# Patient Record
Sex: MALE | Race: WHITE
[De-identification: names, ages, dates, MRNs, and addresses within clinical notes are randomized per-mention and may not be internally consistent; named-entity substitution may affect disease eponyms.]

---

## 2019-01-02 ENCOUNTER — HOSPITAL ENCOUNTER (INPATIENT)
Dept: HOSPITAL 12 - SRC | Age: 24
LOS: 6 days | Discharge: HOME | DRG: 895 | End: 2019-01-08
Attending: INTERNAL MEDICINE | Admitting: INTERNAL MEDICINE
Payer: COMMERCIAL

## 2019-01-02 VITALS — BODY MASS INDEX: 27.2 KG/M2 | HEIGHT: 70 IN | WEIGHT: 190 LBS

## 2019-01-02 VITALS — SYSTOLIC BLOOD PRESSURE: 103 MMHG | DIASTOLIC BLOOD PRESSURE: 58 MMHG

## 2019-01-02 DIAGNOSIS — F11.23: Primary | ICD-10-CM

## 2019-01-02 DIAGNOSIS — Z86.718: ICD-10-CM

## 2019-01-02 DIAGNOSIS — Z79.899: ICD-10-CM

## 2019-01-02 DIAGNOSIS — G47.00: ICD-10-CM

## 2019-01-02 DIAGNOSIS — Z79.01: ICD-10-CM

## 2019-01-02 DIAGNOSIS — L74.519: ICD-10-CM

## 2019-01-02 DIAGNOSIS — F41.1: ICD-10-CM

## 2019-01-02 DIAGNOSIS — F32.9: ICD-10-CM

## 2019-01-02 LAB
ALP SERPL-CCNC: 104 U/L (ref 50–136)
ALT SERPL W/O P-5'-P-CCNC: 26 U/L (ref 16–63)
AMPHETAMINES UR QL SCN>1000 NG/ML: NEGATIVE
AST SERPL-CCNC: 19 U/L (ref 15–37)
BARBITURATES UR QL SCN: NEGATIVE
BASOPHILS # BLD AUTO: 0 K/UL (ref 0–8)
BASOPHILS NFR BLD AUTO: 0.7 % (ref 0–2)
BILIRUB SERPL-MCNC: 0.3 MG/DL (ref 0.2–1)
BUN SERPL-MCNC: 8 MG/DL (ref 7–18)
CHLORIDE SERPL-SCNC: 102 MMOL/L (ref 98–107)
CO2 SERPL-SCNC: 28 MMOL/L (ref 21–32)
COCAINE UR QL SCN: NEGATIVE
CREAT SERPL-MCNC: 1 MG/DL (ref 0.6–1.3)
EOSINOPHIL # BLD AUTO: 0.2 K/UL (ref 0–0.7)
EOSINOPHIL NFR BLD AUTO: 2.4 % (ref 0–7)
ETHANOL SERPL-MCNC: < 3 MG/DL (ref 0–0)
GLUCOSE SERPL-MCNC: 109 MG/DL (ref 74–106)
HCT VFR BLD AUTO: 44.6 % (ref 36.7–47.1)
HGB BLD-MCNC: 15.5 G/DL (ref 12.5–16.3)
LYMPHOCYTES # BLD AUTO: 2 K/UL (ref 20–40)
LYMPHOCYTES NFR BLD AUTO: 29.1 % (ref 20.5–51.5)
MAGNESIUM SERPL-MCNC: 2.3 MG/DL (ref 1.8–2.4)
MCH RBC QN AUTO: 30.1 UUG (ref 23.8–33.4)
MCHC RBC AUTO-ENTMCNC: 35 G/DL (ref 32.5–36.3)
MCV RBC AUTO: 86.7 FL (ref 73–96.2)
MONOCYTES # BLD AUTO: 0.4 K/UL (ref 2–10)
MONOCYTES NFR BLD AUTO: 6.1 % (ref 0–11)
NEUTROPHILS # BLD AUTO: 4.2 K/UL (ref 1.8–8.9)
NEUTROPHILS NFR BLD AUTO: 61.7 % (ref 38.5–71.5)
OPIATES UR QL SCN: POSITIVE
PCP UR QL SCN>25 NG/ML: NEGATIVE
PLATELET # BLD AUTO: 221 K/UL (ref 152–348)
POTASSIUM SERPL-SCNC: 3.6 MMOL/L (ref 3.5–5.1)
RBC # BLD AUTO: 5.14 MIL/UL (ref 4.06–5.63)
THC UR QL SCN>50 NG/ML: NEGATIVE
TSH SERPL DL<=0.005 MIU/L-ACNC: 0.49 MIU/ML (ref 0.36–3.74)
WBC # BLD AUTO: 6.9 K/UL (ref 3.6–10.2)
WS STN SPEC: 8.5 G/DL (ref 6.4–8.2)

## 2019-01-02 PROCEDURE — G0480 DRUG TEST DEF 1-7 CLASSES: HCPCS

## 2019-01-02 PROCEDURE — A4663 DIALYSIS BLOOD PRESSURE CUFF: HCPCS

## 2019-01-02 PROCEDURE — HZ2ZZZZ DETOXIFICATION SERVICES FOR SUBSTANCE ABUSE TREATMENT: ICD-10-PCS | Performed by: INTERNAL MEDICINE

## 2019-01-02 RX ADMIN — BUPRENORPHINE HYDROCHLORIDE PRN MG: 2 TABLET SUBLINGUAL at 20:54

## 2019-01-02 NOTE — NUR
ADMISSION NOTE

Pt is a 23 yr old male, AA&Ox4. Pt is presenting himself to [facility] for heroin use 
and Suboxone use. Pt is observed with anxiety m/b being fidgety. Pt is c/o headache and 
joint pain. Body check was complete; skin is intact, warm and moist to touch. Lung sounds 
are clear bilaterally. Pt states of PMH of Anxiety, DVT, Sepsis, Insomnia and Hyperhidrosis. 
Pt denies any Hx of seizures, 5150 or overdose. Pt states of taking home medication 
Gabapentin, Clonidine PRN for anxiety, Xarelto for Hx of DVT and Seroquel for sleep. All 
home medication was reconciled. Pt states PCP is Dr. Huffman and Psychiatrist is Dr. SHARPE who 
prescribe Suboxone maintenance 3 weeks ago. Pt denies any allergies and follows a regular 
diet. 

Substance Use Hx:

1. Heroin- Pt states he has been first using heroin at the age of 16 yrs old IV or smoked. 
Pt states he relapsed 1 week and 2 days ago and was smoking 1g of heroin daily. Last use was 
on 1/1/19 at 0400, pt states he smoked 0.5g.

2. Suboxone  Pt states he was prescribed Suboxone maintenance 3 weeks ago from his 
Psychiatrist. Pt verbalized he was first taking 8mg SL daily for 1 week and increased to 
12mg SL. Pt states he was on it Suboxone maintenance for 2 weeks before relapsing. Last use 
was today on 1/2/19 at 0700, pt states of taking 8mg SL. 

Pt verbalized he relapsed because his father was diagnosed with 6 months to live if he does 
not receive a heart transplant. Pt states, They told him it would be very difficult to find 
a heart for my dad because of his blood type and the pressure of having to find out he had 6 
months to live just really hurt me so I relapsed. The day I relapsed, they found a match for 
him. Pt states he wants to get sober in order to care for his father. Pt verbalized he 
wants to go into rehabilitation after care and maintain his sobriety. Pt does have his 
family supporting him. Pt was currently in [facility], prior to that he was at 
[facility] for rehabilitation. Pt was previously admitted to [facility] on November 6, 2018. 

Report to Dr. Marshall, pt will be on Subutex PRN for s/s of w/d. Pt was educated one 
medication regimen. Pt was able to verbalize understanding. COWS score upon admission was 6. 
Safety precautions observed. Endorsed to night shift nurse to continue with care.

## 2019-01-02 NOTE — NUR
pre-assessment note: pt is in stable condition no s/s of pain or discomfort. pt is alert and 
oriented and the main source of information. explained protocols and procedures and pt 
verbalized understanding.pt will be admitted to the 3rd floor.

## 2019-01-02 NOTE — NUR
PRN MEDICATION REASSESSMENT



Patient reported having relief from withdrawal symptoms and he stated My stomach feels a 
lot better. I just still have a bit of a headache and my legs are still a little shaky, but 
it was very helpful. Thank you. Patient states that his headache is now at a 4 out of 10 
and his legs are still slightly restless, but manageable. PRN meds Subutex and Imodium noted 
to be effective. Will continue to monitor.

## 2019-01-02 NOTE — NUR
START OF SHIFT



Received patient awake, alert, and oriented x4 lying in bed watching television. Patient is 
a 23 year old male admitted for medically supervised detox from heroin and Suboxone with 
secondary diagnoses of anxiety, insomnia, and hyperhidrosis with a history of sepsis and 
DVT. Per endorsement patient was admitted today at 1644. He relapsed 16 days ago the night 
that he discovered that his father was in heart failure and would need a transplant which he 
was told would be very unlikely to find a proper donor. The stress of this news triggered 
his relapse. Upon assessment, the patient reported having restlessness, anxiety, stomach 
cramps, diaphoresis, and a headache. PRN meds will be given. Initial COWS score is 6 upon 
admission. HOB and bilateral side rails raised for safety. Call light functional and within 
reach. Bed is in a low position and wheels are locked. Will continue to monitor.

## 2019-01-02 NOTE — NUR
COWS = 11



Patient noted with facial flushing, restlessness, diffuse discomfort, stomach cramping, 1 
episode of loose stool, tremors, and anxiety. PRN meds to be administered for symptomatic 
relief. COWS score is 11. Will continue to monitor.

-------------------------------------------------------------------------------

Addendum: 01/03/19 at 0633 by BAYLEE NUR RN

-------------------------------------------------------------------------------

AMENDMENT

COWS score is 12

## 2019-01-02 NOTE — NUR
PRN SUBUTEX AND IMODIUM ADMINISTRATION



Patient reported having symptoms of restlessness (in legs), stomach cramps, diarrhea, 
diaphoresis, and a headache at a level of 7 out of 10. PRN meds Subutex 4 mg and Imodium 4 
mg given per MD orders and patient request. Will continue to monitor closely and assess for 
effectiveness.

## 2019-01-03 VITALS — SYSTOLIC BLOOD PRESSURE: 114 MMHG | DIASTOLIC BLOOD PRESSURE: 67 MMHG

## 2019-01-03 VITALS — DIASTOLIC BLOOD PRESSURE: 49 MMHG | SYSTOLIC BLOOD PRESSURE: 103 MMHG

## 2019-01-03 VITALS — DIASTOLIC BLOOD PRESSURE: 61 MMHG | SYSTOLIC BLOOD PRESSURE: 114 MMHG

## 2019-01-03 VITALS — SYSTOLIC BLOOD PRESSURE: 126 MMHG | DIASTOLIC BLOOD PRESSURE: 76 MMHG

## 2019-01-03 VITALS — DIASTOLIC BLOOD PRESSURE: 74 MMHG | SYSTOLIC BLOOD PRESSURE: 131 MMHG

## 2019-01-03 RX ADMIN — GABAPENTIN SCH MG: 400 CAPSULE ORAL at 15:35

## 2019-01-03 RX ADMIN — BUPRENORPHINE HYDROCHLORIDE SCH MG: 2 TABLET SUBLINGUAL at 20:34

## 2019-01-03 RX ADMIN — QUETIAPINE SCH MG: 100 TABLET, FILM COATED ORAL at 20:35

## 2019-01-03 RX ADMIN — RIVAROXABAN SCH MG: 10 TABLET, FILM COATED ORAL at 17:40

## 2019-01-03 RX ADMIN — BUPRENORPHINE HYDROCHLORIDE SCH MG: 2 TABLET SUBLINGUAL at 17:41

## 2019-01-03 RX ADMIN — THERA TABS SCH UDTAB: TAB at 08:34

## 2019-01-03 RX ADMIN — BUPRENORPHINE HYDROCHLORIDE SCH MG: 2 TABLET SUBLINGUAL at 13:48

## 2019-01-03 RX ADMIN — GABAPENTIN SCH MG: 400 CAPSULE ORAL at 20:35

## 2019-01-03 RX ADMIN — METHOCARBAMOL TABLETS PRN MG: 750 TABLET, COATED ORAL at 08:34

## 2019-01-03 RX ADMIN — BUPRENORPHINE HYDROCHLORIDE PRN MG: 2 TABLET SUBLINGUAL at 08:34

## 2019-01-03 NOTE — NUR
COWS

Pt has generalized body aches 7/10, anxiety, restlessness, runny nose, tearing eyes, goose 
bumps, one episode of diarrhea. His mood is depressed and affect is flat. He is unshaven, 
there are food wrappers on the floor and empty bottles around the room. COWS score is 16. He 
refuses Imodium at this time.

## 2019-01-03 NOTE — NUR
PRN Subutex and Robaxin

Pt has generalized body aches 7/10, anxiety, restlessness, runny nose, tearing eyes, goose 
bumps, one episode of diarrhea. His mood is depressed and affect is flat. He is unshaven, 
there are food wrappers on the floor and empty bottles around the room. COWS score is 16. 
PRN Subutex and Robaxin administered.

## 2019-01-03 NOTE — NUR
COWS = 8



Patient continues to present with anxiety, irritability, stomach cramps, diffuse discomfort, 
and facial flushing. No PRNs requested at this time. Will continue to monitor.

## 2019-01-03 NOTE — NUR
END OF SHIFT



Patient is noted lying in bed with eyes closed and even, unlabored respirations. Patient is 
a 23 year old male admitted for medically supervised detox from heroin and Suboxone with 
secondary diagnoses of anxiety, insomnia, and hyperhidrosis with a history of sepsis and 
DVT. During the shift, the patient reported feeling anxious, tremulous, restless legs, 
diaphoresis, stomach cramps, and a headache and also experienced 1 episode of diarrhea. PRN 
meds Imodium and Subutex given for COWS score of 12 and diarrhea. PRN meds noted to be 
effective. Last COWS score is 8. Patient slept for about 7 hours in the night.  HOB flat and 
bilateral side rails raised for safety. Call light is functional and within reach. Bed is in 
a low position with wheels locked. Endorsed to oncoming AM nurse.

## 2019-01-03 NOTE — NUR
COWS ASSESSMENT



COWS 14. . Pt has been presenting w/ diaphoretic sweats, flushing, body aches, headaches, 
tremors, flat affect, anxiety, and restlessness. V/S: T:97.8, P:77, RR:18, SPO2:100, 
BP:126/80.

## 2019-01-03 NOTE — NUR
VITALS AND COWS DEFERRED



Patient noted lying in bed with eyes closed and even, unlabored respirations. HOB and 
bilateral side rails raised for safety. Vital signs refused and COWS assessment deferred. 
Call light is functional and within reach. Will continue to monitor.

## 2019-01-03 NOTE — NUR
START OF SHIFT NOTE



Rcvd report from outgoing nurse. Pt is a 24 y/o male A/O to person, place, time, and 
purpose. Pt was admitted for medically supervised withdrawal from Opiates. Pt started a 5 
day Subutex taper today. Pt has been presenting w/ diaphoretic sweats, flushing, body aches, 
headaches, tremors, flat affect, anxiety, and restlessness. Pt rcvd PRN Robaxin and Subutex, 
both were noted effective by outgoing nurse. Last COWS 13 @ 1600. Call light is within 
reach. Pt will continue to be monitored and needs met.

## 2019-01-03 NOTE — NUR
START OF SHIFT 

Received report from night shift nurse. Pt is lying in bed resting watching TV. He is a 22 yo Male admitted to [facility] on 1/2 for Opiate withdrawal. He is A&O and ambulatory. PRN 
Subutex ordered for the management of withdrawal symptoms. He was administered PRN Subutex 
and Imodium on night shift. He denies recent episodes of diarrhea. Last COWS was 8 and he 
slept for 7 hours. Safety measures in place.

## 2019-01-04 VITALS — SYSTOLIC BLOOD PRESSURE: 124 MMHG | DIASTOLIC BLOOD PRESSURE: 76 MMHG

## 2019-01-04 VITALS — SYSTOLIC BLOOD PRESSURE: 118 MMHG | DIASTOLIC BLOOD PRESSURE: 72 MMHG

## 2019-01-04 VITALS — DIASTOLIC BLOOD PRESSURE: 63 MMHG | SYSTOLIC BLOOD PRESSURE: 111 MMHG

## 2019-01-04 VITALS — SYSTOLIC BLOOD PRESSURE: 134 MMHG | DIASTOLIC BLOOD PRESSURE: 96 MMHG

## 2019-01-04 PROCEDURE — HZ31ZZZ INDIVIDUAL COUNSELING FOR SUBSTANCE ABUSE TREATMENT, BEHAVIORAL: ICD-10-PCS

## 2019-01-04 PROCEDURE — HZ41ZZZ GROUP COUNSELING FOR SUBSTANCE ABUSE TREATMENT, BEHAVIORAL: ICD-10-PCS

## 2019-01-04 RX ADMIN — BUPRENORPHINE HYDROCHLORIDE SCH MG: 2 TABLET SUBLINGUAL at 15:01

## 2019-01-04 RX ADMIN — THERA TABS SCH UDTAB: TAB at 09:00

## 2019-01-04 RX ADMIN — GABAPENTIN SCH MG: 400 CAPSULE ORAL at 09:00

## 2019-01-04 RX ADMIN — RIVAROXABAN SCH MG: 10 TABLET, FILM COATED ORAL at 17:20

## 2019-01-04 RX ADMIN — BUPRENORPHINE HYDROCHLORIDE SCH MG: 2 TABLET SUBLINGUAL at 20:47

## 2019-01-04 RX ADMIN — BUPRENORPHINE HYDROCHLORIDE SCH MG: 2 TABLET SUBLINGUAL at 09:00

## 2019-01-04 RX ADMIN — GABAPENTIN SCH MG: 400 CAPSULE ORAL at 12:58

## 2019-01-04 RX ADMIN — QUETIAPINE SCH MG: 100 TABLET, FILM COATED ORAL at 20:47

## 2019-01-04 RX ADMIN — GABAPENTIN SCH MG: 400 CAPSULE ORAL at 17:18

## 2019-01-04 NOTE — NUR
COWS DEFERRED



Pt is in bed w/ his eyes closed. Pt's respirations are unlabored and even. Pt will continue 
to be monitored.

## 2019-01-04 NOTE — NUR
COWS ASSESSMENT



COWS 10. Pt has been presenting w/ diaphoretic sweats, runny/stuffy nose, anxiety, depressed 
mood, flat affect, and blocking thought process. V/S: T:98.5, P:85, RR:14, SPO2:97, 
BP:124/76.

## 2019-01-04 NOTE — NUR
END OF SHIFT NOTE



Endorsed pt to oncoming nurse. Pt is a 24 y/o male A/O to person, place, time, and purpose. 
Pt was admitted for medically supervised withdrawal from Opiates. Pt completed day 1 of a 5 
day Subutex taper today. Pt continued presenting w/ diaphoretic sweats, flushing, body 
aches, headaches, tremors, flat affect, anxiety, and restlessness. Pt denies any S/I or H/I. 
No PRN medications were given during current shift. Pts fluid intake was 710ml and he slept 
for 7hrs. Last COWS 14 @ 2000. Call light is within reach.

## 2019-01-04 NOTE — NUR
START OF SHIFT NOTE



Rcvd report from outgoing nurse. Pt is a 22 y/o male A/O to person, place, time, and 
purpose. Pt was admitted for medically supervised withdrawal from Opiates. Pt is on day 2 of 
a 5day Subutex taper. Pt has been presenting w/ diaphoretic sweats, runny/stuffy nose, 
anxiety, depressed mood, flat affect, and blocking thought process. Pt rcvd no PRN 
medications during previous shift. Last COWS 9 @ 1600. Call light is within reach. Pt will 
continue to be monitored and needs met.

## 2019-01-04 NOTE — NUR
END OF SHIFT

Patient continues with ongoing 5 day Subutex taper as ordered, currently on day 2 of taper. 
Patients vital signs WNL. Received no PRNs during shift. Was noted exbiting the following 
s/sx of withdrawal: sweats, mild bone and joint aches, nasal congestion, yawning, anxiety 
and irritability, last COW score of: 8. Encouraged to attend group therapies/sessions to 
learn new coping skills to prevent relapse. Denies SI/HI. Safety measures are in place. Call 
light kept with in reach, will continue to monitor. Patient endorsed to night shift nurse, 
all pertinent information was discussed.

## 2019-01-04 NOTE — NUR
BEGINNING OF SHIFT

Patient endorsement report received from night shift nurse, all pertinent information was 
discussed. Patient continues under close observation, with ongoing 5 day Subutex taper as 
ordered, currently on day 2 of taper. Received no PRNs as per night shift, slept for 7 
hours. Last COW score of: 14. Received awake, alert and oriented x4. Will continue to 
monitor.

## 2019-01-05 VITALS — SYSTOLIC BLOOD PRESSURE: 146 MMHG | DIASTOLIC BLOOD PRESSURE: 78 MMHG

## 2019-01-05 VITALS — SYSTOLIC BLOOD PRESSURE: 98 MMHG | DIASTOLIC BLOOD PRESSURE: 50 MMHG

## 2019-01-05 VITALS — DIASTOLIC BLOOD PRESSURE: 83 MMHG | SYSTOLIC BLOOD PRESSURE: 137 MMHG

## 2019-01-05 VITALS — DIASTOLIC BLOOD PRESSURE: 56 MMHG | SYSTOLIC BLOOD PRESSURE: 122 MMHG

## 2019-01-05 RX ADMIN — BUPRENORPHINE HYDROCHLORIDE SCH MG: 2 TABLET SUBLINGUAL at 21:09

## 2019-01-05 RX ADMIN — QUETIAPINE SCH MG: 100 TABLET, FILM COATED ORAL at 21:08

## 2019-01-05 RX ADMIN — BUPRENORPHINE HYDROCHLORIDE SCH MG: 2 TABLET SUBLINGUAL at 15:04

## 2019-01-05 RX ADMIN — THERA TABS SCH UDTAB: TAB at 09:06

## 2019-01-05 RX ADMIN — GABAPENTIN SCH MG: 400 CAPSULE ORAL at 16:57

## 2019-01-05 RX ADMIN — GABAPENTIN SCH MG: 400 CAPSULE ORAL at 09:06

## 2019-01-05 RX ADMIN — GABAPENTIN SCH MG: 400 CAPSULE ORAL at 12:24

## 2019-01-05 RX ADMIN — CLONIDINE HYDROCHLORIDE PRN MG: 0.1 TABLET ORAL at 21:09

## 2019-01-05 RX ADMIN — CLONIDINE HYDROCHLORIDE PRN MG: 0.1 TABLET ORAL at 12:26

## 2019-01-05 RX ADMIN — RIVAROXABAN SCH MG: 10 TABLET, FILM COATED ORAL at 16:58

## 2019-01-05 RX ADMIN — METHOCARBAMOL TABLETS PRN MG: 750 TABLET, COATED ORAL at 09:06

## 2019-01-05 NOTE — NUR
306

Start  of shift

Pt is here for  medically supervised withdrawal of opiates. Pt on 5 day Subutex taper, today 
is day 3. At 2000 last COWS 10.  Pt slepts 7 hours last night.  Patient presents with 
anxiety,  sweats/chills, stuffy nose, restlessness, flat affect and depressed mood. 
Encouraged Pt to participate in group therapy sessions today to identify positive coping 
skills to maintain sobriety.  All safety measures in place, bed locked/low position. Pt Full 
Code and NKA.  Will continue to monitor for withdrawal symptoms.

## 2019-01-05 NOTE — NUR
Start of shift note 

Pt is a 23 year old male admitted on 1/2/19 for medically supervised opiate withdrawal. Pt 
is on a 5 day Subutex taper started on 1/3/19. Patient is on fall precautions. Pts last 
COWs was 12. Per endorsement pt had PRN Robaxin and Clonidine. Upon rounds pt was noted in 
room watching tv, he seems depressed, withdrawn and has a flat affect. Explain 2100 
medications and plan of care, he verbalized understanding. Pt is breathing even and 
unlabored with no s/s of distress. Safety measures in place, bed locked in low position, 
side rails up x2 and call light within reach. Will continue to monitor.

## 2019-01-05 NOTE — NUR
End  of shift

Pt is here for  medically supervised withdrawal of opiates. Pt on 5 day Subutex taper, today 
is day 3. At 1600 last COWS 12.  PRN given today; Robaxin, Clonidine.  Patient continues to 
have  anxiety,  sweats/chills, flushed face, stuffy nose, restlessness, fatigue, difficulty 
concentrating, anhedonia, flat affect and depressed mood. Encouraged Pt to participate in 
group therapy sessions today to identify positive coping skills to maintain sobriety.  PO 
fluids 1000 ml, VOIDS x 2, BMx1. All safety measures in place, bed locked/low position. Pt 
Full Code and NKA.  Will continue to monitor for withdrawal symptoms.

## 2019-01-05 NOTE — NUR
Reassess Clonidine- Pt reports anxiety improved. He plans to attend group therapy session 
this afternoon.

## 2019-01-05 NOTE — NUR
PRN Clonidine 

Pt was presenting with anxiety and agitation. Administered PRN Clonidine, pt tolerated well. 
Safety measures in place and will continue to monitor.

## 2019-01-05 NOTE — NUR
Start  of shift

Pt is here for  medically supervised withdrawal of opiates. Pt on 5 day Subutex taper, today 
is day 3. At 2000 last COWS 10.  Pt slept 7 hours last night.  Patient presents with 
anxiety,  sweats/chills, stuffy nose, restlessness, flat affect and depressed mood. 
Encouraged Pt to participate in group therapy sessions today to identify positive coping 
skills to maintain sobriety.  All safety measures in place, bed locked/low position. Pt Full 
Code and NKA.  Will continue to monitor for withdrawal symptoms.

## 2019-01-05 NOTE — NUR
PRN Clonidine Reassessment 

Pt is resting in bed watching tv, he verbalized decreased anxiety. Medication noted to be 
effective. Breathing is even and unlabored. Safety measures in place and will continue to 
monitor.

## 2019-01-05 NOTE — NUR
COWS 12-  Patient presents with anxiety,  sweats/chills, stuffy nose, restlessness, fatigue. 
poor appetite, flat affect and depressed mood.

## 2019-01-05 NOTE — NUR
END OF SHIFT NOTE



Endorsed pt to oncoming nurse. Pt is a 24 y/o male A/O to person, place, time, and purpose. 
Pt was admitted for medically supervised withdrawal from Opiates. Pt completed day 2 of a 
5day Subutex taper. Pt continued presenting w/ diaphoretic sweats, runny/stuffy nose, 
anxiety, depressed mood, flat affect, and blocking thought process. Pt denies any S/I or 
H/I. No PRN medications were given during current shift. Pts fluid intake was 250ml and he 
slept for 7hrs. Last COWS 10 @ 2000. Call light is within reach.

## 2019-01-06 VITALS — SYSTOLIC BLOOD PRESSURE: 139 MMHG | DIASTOLIC BLOOD PRESSURE: 77 MMHG

## 2019-01-06 VITALS — DIASTOLIC BLOOD PRESSURE: 62 MMHG | SYSTOLIC BLOOD PRESSURE: 110 MMHG

## 2019-01-06 VITALS — SYSTOLIC BLOOD PRESSURE: 139 MMHG | DIASTOLIC BLOOD PRESSURE: 78 MMHG

## 2019-01-06 VITALS — SYSTOLIC BLOOD PRESSURE: 128 MMHG | DIASTOLIC BLOOD PRESSURE: 69 MMHG

## 2019-01-06 PROCEDURE — HZ59ZZZ INDIVIDUAL PSYCHOTHERAPY FOR SUBSTANCE ABUSE TREATMENT, SUPPORTIVE: ICD-10-PCS

## 2019-01-06 RX ADMIN — BUPRENORPHINE HYDROCHLORIDE SCH MG: 2 TABLET SUBLINGUAL at 15:14

## 2019-01-06 RX ADMIN — BUPRENORPHINE HYDROCHLORIDE SCH MG: 2 TABLET SUBLINGUAL at 09:45

## 2019-01-06 RX ADMIN — BUPRENORPHINE HYDROCHLORIDE SCH MG: 2 TABLET SUBLINGUAL at 21:18

## 2019-01-06 RX ADMIN — GABAPENTIN SCH MG: 400 CAPSULE ORAL at 12:15

## 2019-01-06 RX ADMIN — GABAPENTIN SCH MG: 400 CAPSULE ORAL at 16:53

## 2019-01-06 RX ADMIN — THERA TABS SCH UDTAB: TAB at 09:45

## 2019-01-06 RX ADMIN — CLONIDINE HYDROCHLORIDE PRN MG: 0.1 TABLET ORAL at 16:53

## 2019-01-06 RX ADMIN — RIVAROXABAN SCH MG: 10 TABLET, FILM COATED ORAL at 16:54

## 2019-01-06 RX ADMIN — GABAPENTIN SCH MG: 400 CAPSULE ORAL at 09:45

## 2019-01-06 RX ADMIN — QUETIAPINE SCH MG: 100 TABLET, FILM COATED ORAL at 21:17

## 2019-01-06 RX ADMIN — HYDROXYZINE PAMOATE PRN MG: 25 CAPSULE ORAL at 16:52

## 2019-01-06 NOTE — NUR
END OF SHIFT

Endorse client to incoming nurse, client is in room, a/o x 4,  client continues to presents 
with anxiety, agitation, clammy skin, intermittent chills and sweats, generalized body 
aches, tremors, restlessness, increased yawning, and fatigue. Client denies N/V/D. PRN 
Vistaril 50mg PO for anxiety, Clonidine 0.1mg PO for agitation, noted effective. Client is 
on fourth of 5 day Subutex taper. Last COWS 14 @ 1500. Client is compliant with group 
therapy. Consumes 50-75% of meals. Adequate PO fluid intake 1000mL, void x 2, stool x 1. 
Call light within reach.

## 2019-01-06 NOTE — NUR
PRN Vistaril 50mg PO for anxiety mb increased P 118, client denies any chest pain. 

PRN Clonidine 0.1mg Po for agitation. Will continue to monitor. Call light within reach.

## 2019-01-06 NOTE — NUR
COWS 13

Client reports anxiety, cold/chills, clammy skin, stomach cramps, poor appetite, nasal 
congestion, yawing, fatigue, and difficulty concentrating. Schedule Gabapentin 800mg PO 
administered. Will continue to monitor. Call light within reach.

## 2019-01-06 NOTE — NUR
End of shift note 

Pt is a 23 year old male admitted on 1/2/19 for medically supervised opiate withdrawal. Pt 
is on a 5 day Subutex taper started on 1/3/19. Patient is on fall precautions. Pts last 
COWs was 12, pt continues to present with anxiety, sweats, fine tremors, piloerection and 
agitation. Patient had PRN Clonidine during this shift. Pt slept for 6 hours and had a total 
intake of 2,065 ml. Pt voided x2 and had no bowel movements during this shift. Pt was 
compliant with plan of care. Safety measures in place, bed locked in low position, side 
rails up x2 and call light within reach. Will endorse to day shift.

## 2019-01-06 NOTE — NUR
COWS 14

Client reports increased anxiety, agitation, clammy skin, intermittent chills and sweats, 
generalized body aches, tremors, restlessness, increased yawning, and fatigue. Schedule 
Subutex 2mg SL administered. Call light within reach.

## 2019-01-06 NOTE — NUR
START OF SHIFT

Endorse rcvd from ongoing nurse, client is in room, sitting at the edge of bed, he is a/o x 
4, he presents with depressed mood, dark circles under eyes, dry lips, avoidant gaze, 
tremors, and difficulty concentrating. Client reports depression, poor appetite, anhedonia, 
cold/chills, body aches, restless legs, and difficulty sleeping, he slept 6hrs. Encouraged 
client to attend group therapy to learn skills to maintain sober. PRN Clonidine 0.1mg PO for 
anxiety and agitation. Client is on 4th of 5 day Subutex taper. Last COWS 12 @ 2000. 
Universal precautions. Side rails x 2 up. Call light within reach. Will continue to monitor.

## 2019-01-06 NOTE — NUR
Reassess PRN Vistaril 50mg and Clonidine 0.1mg, client reports feeling relief from anxiety 
and agitation, P 97. Client is in bed, making a bracelet. Call light within reach.

## 2019-01-06 NOTE — NUR
COWS 14

Client presents with depressed mood, dark circles under eyes, dry lips, avoidant gaze, 
tremors, and difficulty concentrating. Client reports depression, poor appetite, anhedonia, 
cold/chills, body aches, restless legs, and difficulty sleeping. Schedule Subutex 2mg SL 
administered. Encourage client to increase PO fluid intake as tolerated to facilitated 
detox. Call light within reach.

## 2019-01-07 VITALS — DIASTOLIC BLOOD PRESSURE: 63 MMHG | SYSTOLIC BLOOD PRESSURE: 118 MMHG

## 2019-01-07 VITALS — DIASTOLIC BLOOD PRESSURE: 70 MMHG | SYSTOLIC BLOOD PRESSURE: 118 MMHG

## 2019-01-07 VITALS — DIASTOLIC BLOOD PRESSURE: 47 MMHG | SYSTOLIC BLOOD PRESSURE: 99 MMHG

## 2019-01-07 VITALS — SYSTOLIC BLOOD PRESSURE: 112 MMHG | DIASTOLIC BLOOD PRESSURE: 64 MMHG

## 2019-01-07 RX ADMIN — QUETIAPINE SCH MG: 100 TABLET, FILM COATED ORAL at 20:14

## 2019-01-07 RX ADMIN — HYDROXYZINE PAMOATE PRN MG: 25 CAPSULE ORAL at 20:14

## 2019-01-07 RX ADMIN — GABAPENTIN SCH MG: 400 CAPSULE ORAL at 12:57

## 2019-01-07 RX ADMIN — GABAPENTIN SCH MG: 400 CAPSULE ORAL at 09:19

## 2019-01-07 RX ADMIN — THERA TABS SCH UDTAB: TAB at 09:19

## 2019-01-07 RX ADMIN — GABAPENTIN SCH MG: 400 CAPSULE ORAL at 17:21

## 2019-01-07 RX ADMIN — RIVAROXABAN SCH MG: 10 TABLET, FILM COATED ORAL at 17:21

## 2019-01-07 NOTE — NUR
START OF SHIFT

Pt is 23 yr old male, A&Ox4. Pt was admitted on 1/2/19 for Opiate withdrawal and is on 5 day 
Subutex taper as ordered. Received report from night shift nurse. No PRN's were given during 
the night. Last COWS score was 12 and slept for 9 hrs. Pt remains in bed sleeping with 
respirations even and unlabored. Skin is intact, warm and dry to touch. Safety precautions 
observed. Call light is within reach. Will continue to monitor.

## 2019-01-07 NOTE — NUR
PRN VISTARIL



Pt complains of increased anxiety. PRN Vistaril administered as ordered. Safety measures in 
place. Will monitor effectiveness.

## 2019-01-07 NOTE — NUR
END OF SHIFT

Pt is a 23 yr old male, AA&Ox4. Pt was admitted on 1/2/19 for Opiate withdrawal and 
completed a 5 day Subutex taper as ordered. Pt has been cooperative with medication regimen 
and plan of care. Pt was observed attending group therapy. Pt was c/o anxiety, sweats and 
chills but states he was able to cope with anxiety level. Skin is intact, warm and moist to 
touch. No PRNs were given during the day. Last COWS score was 5. Pt is to be discharged 
tomorrow to [facility]. Pt states he is ready to be discharged. Safety 
precautions observed. Endorsed to night shift nurse to continue with care.

## 2019-01-07 NOTE — NUR
START OF SHIFT



Received 23 year old male patient admitted on 1/2/19 for Opiate withdrawal. Pt is alert and 
oriented x4. Pt noted to be lying in bed watching TV, he appears disheveled and unshaved. He 
complains of increased anxiety. He completed a 5 day Subutex taper and is scheduled to be DC 
tomorrow to [facility]. He did not receive or request PRN medications. Last COWS:5 at 
1600. Breathing is even and unlabored, safety measures in place. Will continue to monitor.

## 2019-01-07 NOTE — NUR
End of shift note 

Patient is a 23 year old male admitted on 1/2/19 for medically supervised opiate withdrawal. 
Patient is on a 5 day Subutex taper started on 1/3/19. Pt is on fall precautions. Patients 
last COWS was 12. Patient had no PRN medications during this shift. Patient participated in 
group and was compliant with plan of care. Pt is noted to be depressed, withdrawn, anxious 
and flat affect. Pt continues to experience the following withdrawal s/s: tremors, sweats, 
chills and anxiety. Pt slept for 9 hours and had a total intake of 1,000ml. Pt voided x1 and 
had no BM during this shift. Safety measures in place, bed locked in low position, side 
rails up x2 and call light within reach. Will endorse to day shift.

## 2019-01-07 NOTE — NUR
PRN VISTARIL REASSESSMENT



PRN medication effective. Pt is lying in bed with eyes closed and is noted to be asleep. 
Breathing even and unlabored, safety measures in place. Will monitor.

## 2019-01-08 VITALS — DIASTOLIC BLOOD PRESSURE: 57 MMHG | SYSTOLIC BLOOD PRESSURE: 104 MMHG

## 2019-01-08 RX ADMIN — GABAPENTIN SCH MG: 400 CAPSULE ORAL at 08:50

## 2019-01-08 RX ADMIN — THERA TABS SCH UDTAB: TAB at 08:50

## 2019-01-08 NOTE — NUR
END OF SHIFT



Pt is a 23 year old male patient admitted on 1/2/19 for Opiate withdrawal. He remains alert 
and oriented x4. He had complaints of increased anxiety during the shift which was relieved 
with PRN Vistaril at 2101. He is scheduled to be DC today to [facility]. He slept a total 
of 7 hrs, Intake: 1,000mL, Void: x1, BM:x1. Last COWS:5 at 2000. Breathing is even and 
unlabored, safety measures in place. Will endorse to AM shift.

## 2019-01-08 NOTE — NUR
START OF SHIFT

Pt is 23 yr old male, AA&Ox4. Pt was admitted on 1/2/19 for Opiate withdrawal and completed 
a 5 day Subutex taper as ordered. Received report from night shift nurse. Pt was given 
Vistaril PRN during the night. Medication was effective. Last COWS score was 5 and slept for 
7 hrs. Pt is c/o anxiety due to discharge and states he is sweating and having cold chills 
but is able to tolerate symptoms. Skin is intact, warm and moist to touch. Pt is to be 
discharged today to [facility].  Safety precautions observed. Call light is 
within reach. Will continue to f/u.

## 2019-01-08 NOTE — NUR
DISCHARGE NOTE

Pt is 23 yr old male, AA&Ox4. Pt was admitted on 1/2/19 for Opiate withdrawal and completed 
a 5 day Subutex taper as ordered. Pt has been cooperative with medication regimen and plan 
of care. Pt was c/o anxiety but was able to cope with anxiety level. Pt was educated on 
discharged summary and prescriptions. Pt was able to verbalize understanding. Pt was 
discharged off the unit at 0950 in stable condition. Pt was discharged to [facility]. Pt left with all belongings and valuables. No home medication was brought.
